# Patient Record
Sex: MALE | Race: OTHER | HISPANIC OR LATINO | Employment: UNEMPLOYED | URBAN - METROPOLITAN AREA
[De-identification: names, ages, dates, MRNs, and addresses within clinical notes are randomized per-mention and may not be internally consistent; named-entity substitution may affect disease eponyms.]

---

## 2017-10-26 ENCOUNTER — HOSPITAL ENCOUNTER (EMERGENCY)
Facility: HOSPITAL | Age: 42
Discharge: HOME | End: 2017-10-26
Attending: EMERGENCY MEDICINE

## 2017-10-26 VITALS
WEIGHT: 210 LBS | SYSTOLIC BLOOD PRESSURE: 105 MMHG | TEMPERATURE: 98.6 F | RESPIRATION RATE: 12 BRPM | OXYGEN SATURATION: 94 % | DIASTOLIC BLOOD PRESSURE: 66 MMHG | HEART RATE: 103 BPM

## 2017-10-26 DIAGNOSIS — T40.1X1A ACCIDENTAL HEROIN OVERDOSE, INITIAL ENCOUNTER (HCC): Primary | ICD-10-CM

## 2017-10-26 PROCEDURE — 99284 EMERGENCY DEPT VISIT MOD MDM: CPT

## 2017-10-27 NOTE — ED PROVIDER NOTES
History  Chief Complaint   Patient presents with    Heroin Overdose - Accidental     Pt  reports injecting one bag of heroin and doing "some" cocaine around 7 PM  Pt  arousable and answering questions at this time  28-year-old male presents for evaluation after accidental heroin overdose via injection around 7:00 p m  this evening  Patient admits to a longstanding history and heroin abuse  He denies previous history of overdose  He has no complaints at this time  He is sleepy but easily arousable  The patient denies co-ingestion with the heroin    The patient was found by his wife who called 911        History provided by:  Patient      None       History reviewed  No pertinent past medical history  History reviewed  No pertinent surgical history  History reviewed  No pertinent family history  I have reviewed and agree with the history as documented  Social History   Substance Use Topics    Smoking status: Never Smoker    Smokeless tobacco: Never Used    Alcohol use No        Review of Systems   Constitutional: Negative for chills and fever  Respiratory: Negative for chest tightness and shortness of breath  Cardiovascular: Negative for chest pain  Psychiatric/Behavioral: Negative for self-injury and suicidal ideas  All other systems reviewed and are negative  Physical Exam  ED Triage Vitals [10/26/17 2048]   Temperature Pulse Respirations Blood Pressure SpO2   98 6 °F (37 °C) 105 12 100/74 95 %      Temp Source Heart Rate Source Patient Position - Orthostatic VS BP Location FiO2 (%)   Oral Monitor Sitting Right arm --      Pain Score       No Pain           Orthostatic Vital Signs  Vitals:    10/26/17 2048 10/26/17 2141 10/26/17 2209   BP: 100/74 113/66 105/66   Pulse: 105 102 103   Patient Position - Orthostatic VS: Sitting Sitting Sitting       Physical Exam   Constitutional: He is oriented to person, place, and time   Vital signs are normal  He appears well-developed and well-nourished  He appears lethargic  No distress  HENT:   Head: Normocephalic and atraumatic  Right Ear: External ear normal    Left Ear: External ear normal    Mouth/Throat: No oropharyngeal exudate  Eyes: EOM are normal  Pupils are equal, round, and reactive to light  No scleral icterus  Pinpoint pupils   Neck: Normal range of motion  Neck supple  Cardiovascular: Normal rate, regular rhythm and normal heart sounds  Pulmonary/Chest: Effort normal and breath sounds normal  Bradypnea noted  No respiratory distress  Abdominal: Soft  Bowel sounds are normal  There is no tenderness  There is no rebound and no guarding  Musculoskeletal: Normal range of motion  Neurological: He is oriented to person, place, and time  He appears lethargic  Skin: Skin is warm and dry  No rash noted  He is not diaphoretic  Psychiatric: He has a normal mood and affect  Nursing note and vitals reviewed  ED Medications  Medications - No data to display    Diagnostic Studies  Results Reviewed     None                 No orders to display              Procedures  Procedures       Phone Contacts  ED Phone Contact    ED Course  ED Course as of Oct 26 2350   Thu Oct 26, 2017   2156 Patient is stable uponRe-evaluation  The oxygen was discontinued I will make sure the patient maintains a normal oxygen saturation prior to discharge    2225 Patient is still sleepy butArousable  The patient has mild desaturations when sleeping  When he is awake he maintains a normal oxygen saturation without O2 supplementation    2348 Patient able to ambulate to the bathroom without difficulty at this time will be discharged home with his wife  MDM  Number of Diagnoses or Management Options  Accidental heroin overdose, initial encounter: new and requires workup  Diagnosis management comments: The patient is hemodynamically stable upon of initial evaluation    The patient does have some decreased respiratory effort when he falls asleep and has mild desaturation  He will be observed in the emergency department until he can maintain a normal oxygen saturation even when sleeping  The plan is for the patient be discharged home with his wife who is present in the emergency department  The patient (and any family present) verbalized understanding of the discharge instructions and warnings that would necessitate return to the Emergency Department  All questions were answered prior to discharge  Amount and/or Complexity of Data Reviewed  Obtain history from someone other than the patient: yes (Patient's wife)      CritCare Time    Disposition  Final diagnoses:   Accidental heroin overdose, initial encounter     Time reflects when diagnosis was documented in both MDM as applicable and the Disposition within this note     Time User Action Codes Description Comment    10/26/2017 11:10 PM Prudencio, Veena Karma Ramez Accidental heroin overdose, initial encounter       ED Disposition     ED Disposition Condition Comment    Discharge  [facility] discharge to home/self care  Condition at discharge: Stable        Follow-up Information    None       Patient's Medications    No medications on file     No discharge procedures on file      ED Provider  Electronically Signed by           Cristal Richmond DO  10/26/17 5260

## 2017-10-27 NOTE — DISCHARGE INSTRUCTIONS
Opioid Overdose   WHAT YOU NEED TO KNOW:   Opioids are prescription medicines used to treat pain  Some examples include morphine, codeine, methadone, oxycodone, and hydrocodone  An overdose can occur if you take more than the recommended amount  It can also occur if you take opioids with alcohol or certain medicines that can cause harm if taken together  An overdose can also occur if you take an opioid that was prescribed for someone else  Learn to take these medicines safely  An opioid overdose can be life-threatening  DISCHARGE INSTRUCTIONS:   Call 911 for any of the following:   · You have trouble staying awake and your breathing is slow or shallow  Return to the emergency department if:   · Your speech is slurred, or you are confused  · You are dizzy or stumble when you walk  · You are extremely drowsy, or you have trouble staying awake or speaking  · Your body is limp  · You have pale or clammy skin  · You have blue fingernails or lips  · Your heartbeat is slower than normal   Contact your healthcare provider if:   · You have questions or concerns about your condition or care  Follow up with your healthcare provider as directed:  Write down your questions so you remember to ask them during your visits  Prevent opioid overdose:   · Take your medicine exactly as directed  Do not take more of the recommended amount of opioids each time you take it  Do not take opioids more often than recommended  If you use a pain patch, be sure to remove the old patch before you place a new one  · Do not take opioids that belong to someone else  The amount of opioids that person takes may not be right for you  · Do not mix opioids with alcohol, sleeping pills, or street drugs  The combination of these substances can cause an overdose  · Learn about the signs of an overdose  so you know how to respond  Tell others about these symptoms so they will know what to do if needed   Talk to your healthcare provider about naloxone  In some states, you may be able to keep naloxone at home in case of an overdose  Your family and friends can also be trained on how to give it to you if needed  · Keep opioids out of the reach of children  Store opioids in a locked cabinet or in a location that children cannot get to  Ask your healthcare provider how to dispose of any unused opioid medicines  Counseling: Your healthcare provider may recommend that you see a counselor if you are abusing opioids  © 2017 [address] [employer] Information is for End User's use only and may not be sold, redistributed or otherwise used for commercial purposes  All illustrations and images included in CareNotes® are the copyrighted property of [employer]  or [address]  The above information is an  only  It is not intended as medical advice for individual conditions or treatments   Talk to your doctor, nurse or pharmacist before following any medical regimen to see if it is safe and effective for you

## 2018-01-25 ENCOUNTER — APPOINTMENT (EMERGENCY)
Dept: RADIOLOGY | Facility: HOSPITAL | Age: 43
End: 2018-01-25

## 2018-01-25 ENCOUNTER — HOSPITAL ENCOUNTER (EMERGENCY)
Facility: HOSPITAL | Age: 43
Discharge: HOME | End: 2018-01-25
Attending: EMERGENCY MEDICINE | Admitting: EMERGENCY MEDICINE

## 2018-01-25 VITALS
OXYGEN SATURATION: 97 % | SYSTOLIC BLOOD PRESSURE: 137 MMHG | HEIGHT: 72 IN | HEART RATE: 89 BPM | BODY MASS INDEX: 28.44 KG/M2 | DIASTOLIC BLOOD PRESSURE: 68 MMHG | RESPIRATION RATE: 18 BRPM | TEMPERATURE: 98.1 F | WEIGHT: 210 LBS

## 2018-01-25 DIAGNOSIS — J40 BRONCHITIS: Primary | ICD-10-CM

## 2018-01-25 PROCEDURE — 99283 EMERGENCY DEPT VISIT LOW MDM: CPT

## 2018-01-25 PROCEDURE — 71046 X-RAY EXAM CHEST 2 VIEWS: CPT

## 2018-01-25 RX ORDER — AZITHROMYCIN 250 MG/1
TABLET, FILM COATED ORAL
Qty: 6 TABLET | Refills: 0 | Status: SHIPPED | OUTPATIENT
Start: 2018-01-25 | End: 2018-01-30

## 2018-01-25 NOTE — ED PROVIDER NOTES
Emergency Department Note- Vinh Orellana 38 y o  male MRN: [id number]    Unit/Bed#: [facility] Encounter: [id number]        History of Present Illness   HPI:  Vinh Orellana is a 38 y o  male who presents with  Cough dry nonproductive for several days, the patient in is in a drug rehab program former IV drug abuser states he last use 1 month ago he has been in the program from month  The patient has a history of HIV positive he states he has been off medications for approximately 2 months  The patient has never had age defining illness  Patient has had no fever or chills the cough is dry and nonproductive he has no complaints of fever or chills he has no complaints of abdominal pain, he has no complaints of headache, and no complaints of vomiting or diarrhea   there have been several sick contacts at the drug rehab  Review of Systems  REVIEW OF SYSTEMS  Constitutional:  denies fever, chills, no weight loss   Eyes:  Denies visual changes, denies eye pain    HENT:   positive nasal congestion or negative sore throat   Respiratory:   positive cough or  negativeshortness of breath, denies hemoptysis    Cardiovascular:  Denies chest pain, palpitations, or leg edema    GI:  Denies abdominal pain, nausea, vomiting, bloody stools, melena, or diarrhea   :  Denies dysuria, hematuria, polyuria   Musculoskeletal:  Denies back pain or joint pain   Integument:  Denies rash, denies color change    Neurologic:  Denies headache, focal weakness or sensory changes   Endocrine:  Denies polyuria or polydipsia   Lymphatic:  Denies swollen glands   Psychiatric:  Denies depression or anxiety     All system reviewed and negative except as noted above or in HPI    Historical Information   Past Medical History:   Diagnosis Date    [facility]      History reviewed  No pertinent surgical history    Social History   History   Alcohol Use No     History   Drug Use    Types: Heroin, Cocaine     History Smoking Status    Current Every Day Smoker    Packs/day: 1 00   Smokeless Tobacco    Never Used     Family History: History reviewed  No pertinent family history  Meds/Allergies     (Not in a hospital admission)  No Known Allergies    Objective   Vitals: Blood pressure 137/68, pulse 89, temperature 98 1 °F (36 7 °C), temperature source Oral, resp  rate 18, height 6' (1 829 m), weight 95 3 kg (210 lb), SpO2 97 %  PHYSICAL EXAM  Constitutional:  Well developed, well nourished, no acute distress, non toxic appearance   Eyes:   PERRL, EOMI, conjunctiva normal, sclera anicteric, no proptosis   HENT:  Atraumatic, external ears normal, nose normal, oropharynx moist, no pharyngeal exudates  Neck  no JVD, no bruits,  normal range of motion, no tenderness, supple, thyroid normal    Respiratory:  No respiratory distress, normal breath sounds B/L, no rales, no wheezing     Cardiovascular:  NSR, no M/G/R  GI:  Soft,  Normal BS,  ND,  NT,  No mass or bruits   :  No costovertebral angle tenderness   Extremities: No edema, no tenderness, no deformities  Normal pulses   Musculoskeletal:   Back - no midline tenderness,  FROM  Upper and lower extremities   SKIN:   no rash, warm and dry    Neurologic:  Alert & oriented x 3, CN 2-12 intact, normal motor function, normal sensory function, no focal deficits noted, gait normal   Psychiatric:  Speech and behavior appropriate normal judgement and insight      Lab Results: Lab Results: I have personally reviewed pertinent lab results  Labs Reviewed - No data to display  Imaging: I have personally reviewed pertinent reports  XR chest 2 views    (Results Pending)    chest x-ray negative  EKG, Pathology, and Other Studies: I have personally reviewed pertinent films in PACS  ED Course        Assessment/Plan     ED Medical Decision Making:   bronchitis  1   Bronchitis           Isadora Zarco MD  01/25/18 1903

## 2018-01-25 NOTE — DISCHARGE INSTRUCTIONS
Acute Bronchitis   WHAT YOU NEED TO KNOW:   Acute bronchitis is swelling and irritation in the air passages of your lungs  This irritation may cause you to cough or have other breathing problems  Acute bronchitis often starts because of another illness, such as a cold or the flu  The illness spreads from your nose and throat to your windpipe and airways  Bronchitis is often called a chest cold  Acute bronchitis lasts about 3 to 6 weeks and is usually not a serious illness  Your cough can last for several weeks  DISCHARGE INSTRUCTIONS:   Return to the emergency department if:   · You cough up blood  · Your lips or fingernails turn blue  · You feel like you are not getting enough air when you breathe  Contact your healthcare provider if:   · You have a fever  · Your breathing problems do not go away or get worse  · Your cough does not get better within 4 weeks  · You have questions or concerns about your condition or care  Self-care:   · Get more rest   Rest helps your body to heal  Slowly start to do more each day  Rest when you feel it is needed  · Avoid irritants in the air  Avoid chemicals, fumes, and dust  Wear a face mask if you must work around dust or fumes  Stay inside on days when air pollution levels are high  If you have allergies, stay inside when pollen counts are high  Do not use aerosol products, such as spray-on deodorant, bug spray, and hair spray  · Do not smoke or be around others who smoke  Nicotine and other chemicals in cigarettes and cigars damages the cilia that move mucus out of your lungs  Ask your healthcare provider for information if you currently smoke and need help to quit  E-cigarettes or smokeless tobacco still contain nicotine  Talk to your healthcare provider before you use these products  · Drink liquids as directed  Liquids help keep your air passages moist and help you cough up mucus   You may need to drink more liquids when you have acute bronchitis  Ask how much liquid to drink each day and which liquids are best for you  · Use a humidifier or vaporizer  Use a cool mist humidifier or a vaporizer to increase air moisture in your home  This may make it easier for you to breathe and help decrease your cough  Decrease risk for acute bronchitis:   · Get the vaccinations you need  Ask your healthcare provider if you should get vaccinated against the flu or pneumonia  · Prevent the spread of germs  You can decrease your risk of acute bronchitis and other illnesses by doing the following:     [facility] your hands often with soap and water  Carry germ-killing hand lotion or gel with you  You can use the lotion or gel to clean your hands when soap and water are not available  ¨ Do not touch your eyes, nose, or mouth unless you have washed your hands first     ¨ Always cover your mouth when you cough to prevent the spread of germs  It is best to cough into a tissue or your shirt sleeve instead of into your hand  Ask those around you cover their mouths when they cough  ¨ Try to avoid people who have a cold or the flu  If you are sick, stay away from others as much as possible  Medicines: Your healthcare provider may  give you any of the following:  · Ibuprofen or acetaminophen  are medicines that help lower your fever  They are available without a doctor's order  Ask your healthcare provider which medicine is right for you  Ask how much to take and how often to take it  Follow directions  These medicines can cause stomach bleeding if not taken correctly  Ibuprofen can cause kidney damage  Do not take ibuprofen if you have kidney disease, an ulcer, or allergies to aspirin  Acetaminophen can cause liver damage  Do not take more than 4,000 milligrams in 24 hours  · Decongestants  help loosen mucus in your lungs and make it easier to cough up  This can help you breathe easier  · Cough suppressants  decrease your urge to cough   If your cough produces mucus, do not take a cough suppressant unless your healthcare provider tells you to  Your healthcare provider may suggest that you take a cough suppressant at night so you can rest     · Inhalers  may be given  Your healthcare provider may give you one or more inhalers to help you breathe easier and cough less  An inhaler gives your medicine to open your airways  Ask your healthcare provider to show you how to use your inhaler correctly  · Take your medicine as directed  Contact your healthcare provider if you think your medicine is not helping or if you have side effects  Tell him of her if you are allergic to any medicine  Keep a list of the medicines, vitamins, and herbs you take  Include the amounts, and when and why you take them  Bring the list or the pill bottles to follow-up visits  Carry your medicine list with you in case of an emergency  Follow up with your healthcare provider as directed:  Write down questions you have so you will remember to ask them during your follow-up visits  © 2017 [address] [employer] Information is for End User's use only and may not be sold, redistributed or otherwise used for commercial purposes  All illustrations and images included in CareNotes® are the copyrighted property of [employer]  or [employer]  The above information is an  only  It is not intended as medical advice for individual conditions or treatments  Talk to your doctor, nurse or pharmacist before following any medical regimen to see if it is safe and effective for you